# Patient Record
Sex: MALE | Race: OTHER
[De-identification: names, ages, dates, MRNs, and addresses within clinical notes are randomized per-mention and may not be internally consistent; named-entity substitution may affect disease eponyms.]

---

## 2019-10-21 ENCOUNTER — HOSPITAL ENCOUNTER (EMERGENCY)
Dept: HOSPITAL 54 - ER | Age: 49
Discharge: HOME | End: 2019-10-21
Payer: MEDICAID

## 2019-10-21 VITALS — SYSTOLIC BLOOD PRESSURE: 119 MMHG | DIASTOLIC BLOOD PRESSURE: 76 MMHG

## 2019-10-21 VITALS — WEIGHT: 201 LBS | HEIGHT: 70 IN | BODY MASS INDEX: 28.77 KG/M2

## 2019-10-21 DIAGNOSIS — F17.200: ICD-10-CM

## 2019-10-21 DIAGNOSIS — R07.89: Primary | ICD-10-CM

## 2019-10-21 DIAGNOSIS — I10: ICD-10-CM

## 2019-10-21 LAB
BASOPHILS # BLD AUTO: 0.1 /CMM (ref 0–0.2)
BASOPHILS NFR BLD AUTO: 1.2 % (ref 0–2)
BUN SERPL-MCNC: 16 MG/DL (ref 7–18)
CALCIUM SERPL-MCNC: 7.9 MG/DL (ref 8.5–10.1)
CHLORIDE SERPL-SCNC: 105 MMOL/L (ref 98–107)
CO2 SERPL-SCNC: 24 MMOL/L (ref 21–32)
CREAT SERPL-MCNC: 0.9 MG/DL (ref 0.6–1.3)
EOSINOPHIL NFR BLD AUTO: 3.7 % (ref 0–6)
GLUCOSE SERPL-MCNC: 165 MG/DL (ref 74–106)
HCT VFR BLD AUTO: 42 % (ref 39–51)
HGB BLD-MCNC: 15.9 G/DL (ref 13.5–17.5)
LYMPHOCYTES NFR BLD AUTO: 3.1 /CMM (ref 0.8–4.8)
LYMPHOCYTES NFR BLD AUTO: 32.4 % (ref 20–44)
MCHC RBC AUTO-ENTMCNC: 38 G/DL (ref 31–36)
MCV RBC AUTO: 89 FL (ref 80–96)
MONOCYTES NFR BLD AUTO: 0.6 /CMM (ref 0.1–1.3)
MONOCYTES NFR BLD AUTO: 6.8 % (ref 2–12)
NEUTROPHILS # BLD AUTO: 5.3 /CMM (ref 1.8–8.9)
NEUTROPHILS NFR BLD AUTO: 55.9 % (ref 43–81)
PLATELET # BLD AUTO: 278 /CMM (ref 150–450)
POTASSIUM SERPL-SCNC: 3.8 MMOL/L (ref 3.5–5.1)
RBC # BLD AUTO: 4.74 MIL/UL (ref 4.5–6)
SODIUM SERPL-SCNC: 140 MMOL/L (ref 136–145)
WBC NRBC COR # BLD AUTO: 9.6 K/UL (ref 4.3–11)

## 2019-10-21 NOTE — NUR
IV INITIATED LAC 18G. LABS DRAWN FROM SITE. PHLEBOTOMIST AT BEDSIDE FOR 
COLLECTION. IV INTACT AND PATENT, PLACED ON SALINE LOCK

## 2019-10-21 NOTE — NUR
PT BIBWIFE C/O MIDSTERNAL CHEST PAIN X30 MIN PTA. PT STATES HE WAS SLEEPING, 
PAIN WOKE HIM UP. DENIES RADIATION OF PAIN, HEADACHE, NAUSEA, DIZZINESS, SOB. 
PT AAOX4. RESPIRATIONS EVEN AND UNLABORED. SKIN WARM AND INTACT. NO ACUTE 
DISTRESS NOTED AT THIS TIME. PLACED IN GOWN AND ON CONTINUOUS CARDIAC MONITOR, 
WILL CONTINUE TO MONITOR

## 2019-10-21 NOTE — NUR
PT RESTING COMFORTABLY IN BED. VITAL SIGNS STABLE. NO ACUTE DISTRESS NOTED AT 
THIS TIME. STILL ON CONTINUOUS CARDIAC MONITOR AND PULSE OX, WILL CONTINUE TO 
MONITOR.